# Patient Record
Sex: FEMALE | Race: BLACK OR AFRICAN AMERICAN | Employment: OTHER
[De-identification: names, ages, dates, MRNs, and addresses within clinical notes are randomized per-mention and may not be internally consistent; named-entity substitution may affect disease eponyms.]

---

## 2024-01-06 ENCOUNTER — HOSPITAL ENCOUNTER (EMERGENCY)
Facility: HOSPITAL | Age: 89
Discharge: HOME | End: 2024-01-06
Attending: EMERGENCY MEDICINE
Payer: MEDICARE

## 2024-01-06 ENCOUNTER — APPOINTMENT (OUTPATIENT)
Facility: HOSPITAL | Age: 89
End: 2024-01-06
Payer: MEDICARE

## 2024-01-06 VITALS
OXYGEN SATURATION: 98 % | BODY MASS INDEX: 24.17 KG/M2 | RESPIRATION RATE: 16 BRPM | HEART RATE: 79 BPM | HEIGHT: 63 IN | SYSTOLIC BLOOD PRESSURE: 119 MMHG | DIASTOLIC BLOOD PRESSURE: 80 MMHG | TEMPERATURE: 97.5 F | WEIGHT: 136.4 LBS

## 2024-01-06 DIAGNOSIS — I48.91 ATRIAL FIBRILLATION, UNSPECIFIED TYPE (HCC): ICD-10-CM

## 2024-01-06 DIAGNOSIS — R13.10 DYSPHAGIA, UNSPECIFIED TYPE: Primary | ICD-10-CM

## 2024-01-06 LAB
ALBUMIN SERPL-MCNC: 3 G/DL (ref 3.4–5)
ALBUMIN/GLOB SERPL: 0.9 (ref 0.8–1.7)
ALP SERPL-CCNC: 69 U/L (ref 45–117)
ALT SERPL-CCNC: 32 U/L (ref 13–56)
ANION GAP SERPL CALC-SCNC: 6 MMOL/L (ref 3–18)
APPEARANCE UR: ABNORMAL
AST SERPL-CCNC: 32 U/L (ref 10–38)
BACTERIA URNS QL MICRO: ABNORMAL /HPF
BASOPHILS # BLD: 0.1 K/UL (ref 0–0.1)
BASOPHILS NFR BLD: 1 % (ref 0–2)
BILIRUB SERPL-MCNC: 1.4 MG/DL (ref 0.2–1)
BILIRUB UR QL: ABNORMAL
BUN SERPL-MCNC: 26 MG/DL (ref 7–18)
BUN/CREAT SERPL: 16 (ref 12–20)
CALCIUM SERPL-MCNC: 8.7 MG/DL (ref 8.5–10.1)
CHLORIDE SERPL-SCNC: 108 MMOL/L (ref 100–111)
CO2 SERPL-SCNC: 24 MMOL/L (ref 21–32)
COLOR UR: ABNORMAL
CREAT SERPL-MCNC: 1.62 MG/DL (ref 0.6–1.3)
DIFFERENTIAL METHOD BLD: ABNORMAL
EOSINOPHIL # BLD: 0 K/UL (ref 0–0.4)
EOSINOPHIL NFR BLD: 1 % (ref 0–5)
EPITH CASTS URNS QL MICRO: ABNORMAL /LPF (ref 0–5)
ERYTHROCYTE [DISTWIDTH] IN BLOOD BY AUTOMATED COUNT: 14.5 % (ref 11.6–14.5)
GLOBULIN SER CALC-MCNC: 3.5 G/DL (ref 2–4)
GLUCOSE SERPL-MCNC: 169 MG/DL (ref 74–99)
GLUCOSE UR STRIP.AUTO-MCNC: NEGATIVE MG/DL
GRAN CASTS URNS QL MICRO: ABNORMAL /LPF
HCT VFR BLD AUTO: 42.8 % (ref 35–45)
HGB BLD-MCNC: 13.6 G/DL (ref 12–16)
HGB UR QL STRIP: NEGATIVE
HYALINE CASTS URNS QL MICRO: ABNORMAL /LPF (ref 0–2)
IMM GRANULOCYTES # BLD AUTO: 0.1 K/UL (ref 0–0.04)
IMM GRANULOCYTES NFR BLD AUTO: 1 % (ref 0–0.5)
KETONES UR QL STRIP.AUTO: NEGATIVE MG/DL
LEUKOCYTE ESTERASE UR QL STRIP.AUTO: ABNORMAL
LIPASE SERPL-CCNC: 76 U/L (ref 13–75)
LYMPHOCYTES # BLD: 0.9 K/UL (ref 0.9–3.6)
LYMPHOCYTES NFR BLD: 15 % (ref 21–52)
MAGNESIUM SERPL-MCNC: 2.2 MG/DL (ref 1.6–2.6)
MCH RBC QN AUTO: 27.4 PG (ref 24–34)
MCHC RBC AUTO-ENTMCNC: 31.8 G/DL (ref 31–37)
MCV RBC AUTO: 86.3 FL (ref 78–100)
MONOCYTES # BLD: 0.5 K/UL (ref 0.05–1.2)
MONOCYTES NFR BLD: 8 % (ref 3–10)
NEUTS SEG # BLD: 4.7 K/UL (ref 1.8–8)
NEUTS SEG NFR BLD: 75 % (ref 40–73)
NITRITE UR QL STRIP.AUTO: NEGATIVE
NRBC # BLD: 0 K/UL (ref 0–0.01)
NRBC BLD-RTO: 0 PER 100 WBC
PH UR STRIP: 5 (ref 5–8)
PLATELET # BLD AUTO: 572 K/UL (ref 135–420)
PMV BLD AUTO: 9.1 FL (ref 9.2–11.8)
POTASSIUM SERPL-SCNC: 4.4 MMOL/L (ref 3.5–5.5)
PROT SERPL-MCNC: 6.5 G/DL (ref 6.4–8.2)
PROT UR STRIP-MCNC: 100 MG/DL
RBC # BLD AUTO: 4.96 M/UL (ref 4.2–5.3)
RBC #/AREA URNS HPF: ABNORMAL /HPF (ref 0–5)
SODIUM SERPL-SCNC: 138 MMOL/L (ref 136–145)
SP GR UR REFRACTOMETRY: >1.03 (ref 1–1.03)
TROPONIN I SERPL HS-MCNC: 20 NG/L (ref 0–54)
TROPONIN I SERPL HS-MCNC: 21 NG/L (ref 0–54)
TSH SERPL DL<=0.05 MIU/L-ACNC: 1.81 UIU/ML (ref 0.36–3.74)
UROBILINOGEN UR QL STRIP.AUTO: 1 EU/DL (ref 0.2–1)
WBC # BLD AUTO: 6.2 K/UL (ref 4.6–13.2)
WBC URNS QL MICRO: ABNORMAL /HPF (ref 0–4)

## 2024-01-06 PROCEDURE — 84484 ASSAY OF TROPONIN QUANT: CPT

## 2024-01-06 PROCEDURE — 99285 EMERGENCY DEPT VISIT HI MDM: CPT

## 2024-01-06 PROCEDURE — 93005 ELECTROCARDIOGRAM TRACING: CPT | Performed by: EMERGENCY MEDICINE

## 2024-01-06 PROCEDURE — 71045 X-RAY EXAM CHEST 1 VIEW: CPT

## 2024-01-06 PROCEDURE — 83735 ASSAY OF MAGNESIUM: CPT

## 2024-01-06 PROCEDURE — 81001 URINALYSIS AUTO W/SCOPE: CPT

## 2024-01-06 PROCEDURE — 83690 ASSAY OF LIPASE: CPT

## 2024-01-06 PROCEDURE — 70450 CT HEAD/BRAIN W/O DYE: CPT

## 2024-01-06 PROCEDURE — 84443 ASSAY THYROID STIM HORMONE: CPT

## 2024-01-06 PROCEDURE — 2500000003 HC RX 250 WO HCPCS: Performed by: EMERGENCY MEDICINE

## 2024-01-06 PROCEDURE — 85025 COMPLETE CBC W/AUTO DIFF WBC: CPT

## 2024-01-06 PROCEDURE — 96374 THER/PROPH/DIAG INJ IV PUSH: CPT

## 2024-01-06 PROCEDURE — 80053 COMPREHEN METABOLIC PANEL: CPT

## 2024-01-06 RX ORDER — DILTIAZEM HYDROCHLORIDE 5 MG/ML
10 INJECTION INTRAVENOUS ONCE
Status: COMPLETED | OUTPATIENT
Start: 2024-01-06 | End: 2024-01-06

## 2024-01-06 RX ORDER — METOPROLOL SUCCINATE 50 MG/1
50 TABLET, EXTENDED RELEASE ORAL 2 TIMES DAILY
COMMUNITY
Start: 2024-01-04 | End: 2024-02-03

## 2024-01-06 RX ORDER — ONDANSETRON 4 MG/1
4 TABLET, ORALLY DISINTEGRATING ORAL EVERY 8 HOURS PRN
COMMUNITY
Start: 2024-01-04 | End: 2024-01-18

## 2024-01-06 RX ADMIN — DILTIAZEM HYDROCHLORIDE 10 MG: 5 INJECTION INTRAVENOUS at 16:56

## 2024-01-06 ASSESSMENT — PAIN - FUNCTIONAL ASSESSMENT: PAIN_FUNCTIONAL_ASSESSMENT: NONE - DENIES PAIN

## 2024-01-06 NOTE — ED PROVIDER NOTES
[facility] EMERGENCY DEPT  EMERGENCY DEPARTMENT ENCOUNTER    Patient Name: Marija Shrestha  MRN: [id number]  YOB: 1930  Provider: Niko Krishna DO  PCP: Cristela Joseph MD   Time/Date of evaluation: 1:31 PM EST on 1/6/24    History of Presenting Illness     History Provided by: Patient  History is limited by: Nothing     HISTORY:   Marija Shrestha is a 89 y.o. female with recent diagnosis of DVT, and pulmonary embolism currently on Eliquis presents with a chief complaint of decreased appetite and difficulty keeping down her meals.  Patient states that she was recently diagnosed with DVT and PE at [facility].  She was diagnosed on December 21 with acute DVT of the left popliteal and left femoral veins.  She was also diagnosed with A-fib with RVR and CHF during that admission.  She was seen and the [facility] emergency department on January 4 and diagnosed with dysphagia.  She was given follow-up referral to Dr. Jose M Hester, ENT physician but states that she was told that she could not get an appointment until February 2024.      nursing Notes were all reviewed and agreed with or any disagreements were addressed in the HPI.    Past History     PAST MEDICAL HISTORY:  Past Medical History:   Diagnosis Date    Atrial fibrillation (HCC)     DVT (deep venous thrombosis) (HCC)     Enlarged thyroid     Hypercholesterolemia     Hypertension     Multinodular goiter     Osteoporosis     Paroxysmal nocturnal dyspnea     Post-nasal drip        PAST SURGICAL HISTORY:  History reviewed. No pertinent surgical history.    FAMILY HISTORY:  History reviewed. No pertinent family history.    SOCIAL HISTORY:       MEDICATIONS:  No current facility-administered medications for this encounter.     Current Outpatient Medications   Medication Sig Dispense Refill    apixaban (ELIQUIS) 5 MG TABS tablet Take 1 tablet by mouth 2 times daily      metoprolol succinate (TOPROL XL) 50 MG extended release tablet Take 1 tablet by  Notes from Outside Facility (Separate Group), Previous EKGs, Ambulance Run Sheet, Previous Radiology Studies, and Previous Laboratory Studies    Is this patient to be included in the SEP-1 core measure? No Exclusion criteria - the patient is NOT to be included for SEP-1 Core Measure due to: Infection is not suspected    MEDICATIONS ADMINISTERED IN THE ED:  Medications   dilTIAZem injection 10 mg (10 mg IntraVENous Given 1/6/24 7237)            None    Critical Care: None    Diagnosis and Disposition   Diagnosis:   1. Dysphagia, unspecified type    2. Atrial fibrillation, unspecified type (HCC)          Disposition:    DISPOSITION Decision To Discharge 01/06/2024 05:10:03 PM    Home or Self Care     [unfilled]      Dragon Disclaimer     Please note that this dictation was completed with Adenios, the computer voice recognition software. Quite often unanticipated grammatical, syntax, homophones, and other interpretive errors are inadvertently transcribed by the computer software. Please disregard these errors. Please excuse any errors that have escaped final proofreading.      I Niko Krishna DO am the primary clinician of record.  Niko Krishna DO  (Electronically signed)            Niko Krishna DO  01/06/24 6438

## 2024-01-06 NOTE — ED NOTES
Spoke with Melisa in lab who states she got the recollected blood and will reactivate the orders.   Patient up to bathroom with steady gait, urine specimen obtained, and is back to stretcher.

## 2024-01-06 NOTE — ED NOTES
Patient states went to [facility] ED for difficulty swallowing and told she had a thyroid problem and referred to ENT. She also was started on Eliquis for PE and DVT x2 to left leg. She states she can not get into ENT for a month. Provider at bedside.

## 2024-01-06 NOTE — ED NOTES
I have introduced myself to the patient and discussed anticipated plan of care. Patient resting quietly on stretcher in low and locked position. Call bell in reach. Side rail up x1.    Patient on cardiac monitor and continuous pulse oximetry.

## 2024-01-07 LAB
EKG ATRIAL RATE: 357 BPM
EKG DIAGNOSIS: NORMAL
EKG Q-T INTERVAL: 350 MS
EKG QRS DURATION: 80 MS
EKG QTC CALCULATION (BAZETT): 488 MS
EKG R AXIS: 30 DEGREES
EKG T AXIS: 51 DEGREES
EKG VENTRICULAR RATE: 117 BPM

## 2024-01-07 PROCEDURE — 93010 ELECTROCARDIOGRAM REPORT: CPT | Performed by: INTERNAL MEDICINE

## 2024-01-18 ENCOUNTER — OFFICE VISIT (OUTPATIENT)
Age: 89
End: 2024-01-18

## 2024-01-18 VITALS
HEART RATE: 109 BPM | OXYGEN SATURATION: 94 % | DIASTOLIC BLOOD PRESSURE: 86 MMHG | HEIGHT: 63 IN | SYSTOLIC BLOOD PRESSURE: 132 MMHG | WEIGHT: 137 LBS | BODY MASS INDEX: 24.27 KG/M2

## 2024-01-18 DIAGNOSIS — I48.91 ATRIAL FIBRILLATION, UNSPECIFIED TYPE (HCC): Primary | ICD-10-CM

## 2024-01-18 RX ORDER — FUROSEMIDE 20 MG/1
20 TABLET ORAL
Qty: 30 TABLET | Refills: 6 | Status: SHIPPED | OUTPATIENT
Start: 2024-01-19

## 2024-01-18 ASSESSMENT — PATIENT HEALTH QUESTIONNAIRE - PHQ9
SUM OF ALL RESPONSES TO PHQ QUESTIONS 1-9: 0
1. LITTLE INTEREST OR PLEASURE IN DOING THINGS: 0
SUM OF ALL RESPONSES TO PHQ9 QUESTIONS 1 & 2: 0
SUM OF ALL RESPONSES TO PHQ QUESTIONS 1-9: 0
2. FEELING DOWN, DEPRESSED OR HOPELESS: 0

## 2024-01-18 NOTE — PATIENT INSTRUCTIONS
Start: Lasix 20 mg, 3 times a week  PCP: Dr. Jessica Bo Telephone # [phone]  or   PCP: Dr. Nora Rodriguez Telephone # [phone]    Patient Education        Low Sodium Diet (2,000 Milligram): Care Instructions  Overview     Limiting sodium can be an important part of managing some health problems.  The most common source of sodium is salt. People get most of the salt in their diet from canned, prepared, and packaged foods. Fast food and restaurant meals also are very high in sodium. Your doctor will probably limit your sodium to less than 2,000 milligrams (mg) a day. This limit counts all the sodium in prepared and packaged foods and any salt you add to your food.  Follow-up care is a key part of your treatment and safety. Be sure to make and go to all appointments, and call your doctor if you are having problems. It's also a good idea to know your test results and keep a list of the medicines you take.  How can you care for yourself at home?  Read food labels  Read labels on cans and food packages. The labels tell you how much sodium is in each serving. Make sure that you look at the serving size. If you eat more than the serving size, you have eaten more sodium.  Food labels also tell you the Percent Daily Value for sodium. Choose products with low Percent Daily Values for sodium.  Be aware that sodium can come in forms other than salt, including monosodium glutamate (MSG), sodium citrate, and sodium bicarbonate (baking soda). MSG is often added to Asian food. When you eat out, you can sometimes ask for food without MSG or added salt.  Buy low-sodium foods  Buy foods that are labeled \"unsalted\" (no salt added), \"sodium-free\" (less than 5 mg of sodium per serving), or \"low-sodium\" (140 mg or less of sodium per serving). Foods labeled \"reduced-sodium\" and \"light sodium\" may still have too much sodium. Be sure to read the label to see how much sodium you are getting.  Buy fresh vegetables, or frozen

## 2024-01-18 NOTE — PROGRESS NOTES
Marija S Fady presents today for   Chief Complaint   Patient presents with    New Patient     Est care referred by self due to seen at ED 1/6/24 fdx with CHRIS       Marija Shrestha preferred language for health care discussion is english/other.    Is someone accompanying this pt? NO    Is the patient using any DME equipment during OV? NO    Depression Screening:  Depression: Not on file        Learning Assessment:  Who is the primary learner? Patient    What is the preferred language for health care of the primary learner? ENGLISH    How does the primary learner prefer to learn new concepts? DEMONSTRATION    Answered By PATIENT    Relationship to Learner SELF           Pt currently taking Anticoagulant therapy? ELIQUIS 5 MG 2X DAILY     Pt currently taking Antiplatelet therapy ? NO      Coordination of Care:  1. Have you been to the ER, urgent care clinic since your last visit? Hospitalized since your last visit? NO    2. Have you seen or consulted any other health care providers outside of the [facility] since your last visit? Include any pap smears or colon screening. NO    
Vascular: No carotid bruit.   Cardiovascular:      Rate and Rhythm: Normal rate. Rhythm irregular.      Pulses: Normal pulses.      Heart sounds: No murmur heard.     No friction rub. No gallop.      Comments: +JVD  Pulmonary:      Effort: Pulmonary effort is normal.      Breath sounds: Normal breath sounds. No wheezing or rales.   Abdominal:      Palpations: Abdomen is soft.   Musculoskeletal:      Cervical back: Neck supple.      Right lower leg: Edema present.      Left lower leg: Edema present.   Skin:     General: Skin is warm and dry.   Neurological:      General: No focal deficit present.      Mental Status: She is alert and oriented to person, place, and time.   Psychiatric:         Mood and Affect: Mood normal.         EKG atrial fib 109    Echo 12/2023    Left Ventricle: Left ventricle size is normal. There is increased wall   thickness with normal left ventricle mass index consistent with concentric   remodeling. There is moderate global hypokinesis present. The ejection   fraction by visual approximation is 35 - 40%. There is moderate systolic   dysfunction.    Left Atrium: Left atrium is severely dilated.     Tricuspid Valve: Tricuspid valve appears normal. Normal leaflet   thickness. Moderate to severe (3+) transvalvular regurgitation. Normal   pulmonary artery pressure.  RVSP is 35.0 mmHg.     Right Atrium: Right atrium is dilated.     Right Ventricle: Right ventricle size is normal. Global hypokinesis   present. Mildly reduced systolic function.     Aortic Valve: Trileaflet aortic valve. No stenosis. No transvalvular   regurgitation .     Mitral Valve: Mitral valve appears normal. No stenosis. Moderate (2-3+)   transvalvular regurgitation with multiple jets .       Impression and Plan:  Marija Shrestha is a 89 y.o. with:    AECHF/ RHF/ HFrEF 35-40%  pAFib, currently rate controlled  S/p DVT/ PE, known 12/2023  HTN  CKD, Scr 1.6  Advanced age    Continue Toprol + NOAC for Afib  NOAC dose may need to

## 2024-01-22 ENCOUNTER — HOSPITAL ENCOUNTER (OUTPATIENT)
Facility: HOSPITAL | Age: 89
Discharge: HOME | End: 2024-01-25
Attending: OTOLARYNGOLOGY
Payer: MEDICARE

## 2024-01-22 DIAGNOSIS — R13.10 DYSPHAGIA, UNSPECIFIED TYPE: ICD-10-CM

## 2024-01-22 PROCEDURE — 6370000000 HC RX 637 (ALT 250 FOR IP): Performed by: STUDENT IN AN ORGANIZED HEALTH CARE EDUCATION/TRAINING PROGRAM

## 2024-01-22 PROCEDURE — 74220 X-RAY XM ESOPHAGUS 1CNTRST: CPT

## 2024-01-22 PROCEDURE — 2500000003 HC RX 250 WO HCPCS: Performed by: STUDENT IN AN ORGANIZED HEALTH CARE EDUCATION/TRAINING PROGRAM

## 2024-01-22 RX ADMIN — BARIUM SULFATE 176 G: 960 POWDER, FOR SUSPENSION ORAL at 11:56

## 2024-01-22 RX ADMIN — ANTACID/ANTIFLATULENT 1 EACH: 380; 550; 10; 10 GRANULE, EFFERVESCENT ORAL at 11:55

## 2024-01-22 RX ADMIN — BARIUM SULFATE 135 ML: 980 POWDER, FOR SUSPENSION ORAL at 11:55

## 2024-01-22 RX ADMIN — BARIUM SULFATE 1 TABLET: 700 TABLET ORAL at 11:56

## 2024-02-05 ENCOUNTER — TELEPHONE (OUTPATIENT)
Age: 89
End: 2024-02-05

## 2024-02-06 RX ORDER — BUMETANIDE 1 MG/1
1 TABLET ORAL DAILY
Qty: 30 TABLET | Refills: 1 | Status: SHIPPED | OUTPATIENT
Start: 2024-02-06

## 2024-02-06 RX ORDER — AMIODARONE HYDROCHLORIDE 200 MG/1
200 TABLET ORAL 2 TIMES DAILY
COMMUNITY
Start: 2024-01-31 | End: 2024-02-06 | Stop reason: SDUPTHER

## 2024-02-06 RX ORDER — METOPROLOL SUCCINATE 25 MG/1
75 TABLET, EXTENDED RELEASE ORAL 2 TIMES DAILY
COMMUNITY
End: 2024-02-06 | Stop reason: SDUPTHER

## 2024-02-06 RX ORDER — AMIODARONE HYDROCHLORIDE 200 MG/1
200 TABLET ORAL 2 TIMES DAILY
Qty: 30 TABLET | Refills: 1 | Status: SHIPPED | OUTPATIENT
Start: 2024-02-06

## 2024-02-06 RX ORDER — SPIRONOLACTONE 25 MG/1
12.5 TABLET ORAL DAILY
Qty: 30 TABLET | Refills: 1 | Status: SHIPPED | OUTPATIENT
Start: 2024-02-06

## 2024-02-06 RX ORDER — METOPROLOL SUCCINATE 25 MG/1
75 TABLET, EXTENDED RELEASE ORAL 2 TIMES DAILY
Qty: 180 TABLET | Refills: 1 | Status: SHIPPED | OUTPATIENT
Start: 2024-02-06

## 2024-02-06 RX ORDER — SPIRONOLACTONE 25 MG/1
12.5 TABLET ORAL DAILY
COMMUNITY
Start: 2024-01-31 | End: 2024-02-06 | Stop reason: SDUPTHER

## 2024-02-06 RX ORDER — BUMETANIDE 1 MG/1
1 TABLET ORAL DAILY
COMMUNITY
Start: 2024-01-31 | End: 2024-02-06 | Stop reason: SDUPTHER

## 2024-02-06 NOTE — TELEPHONE ENCOUNTER
Received fax from [facility] regarding concern for possible therapeutic duplication, for drugs: bumetanide and furosemide.   Called patient to clarify. Spoke with her son, who assisted me in updating her medication list. Per patient's son, patient is no longer taking furosemide. This was discontinued during a recent hospital stay. Medication list is now updated with most recent information.   He also stated that his mother needs some refills. Will send refill request to provider.

## 2024-02-15 ENCOUNTER — HOSPITAL ENCOUNTER (OUTPATIENT)
Facility: HOSPITAL | Age: 89
Discharge: HOME | End: 2024-02-15
Payer: MEDICARE

## 2024-02-15 LAB
ALBUMIN SERPL-MCNC: 4 G/DL (ref 3.4–5)
ALBUMIN/GLOB SERPL: 1.2 (ref 0.8–1.7)
ALP SERPL-CCNC: 74 U/L (ref 45–117)
ALT SERPL-CCNC: 22 U/L (ref 13–56)
ANION GAP SERPL CALC-SCNC: 7 MMOL/L (ref 3–18)
AST SERPL-CCNC: 23 U/L (ref 10–38)
BILIRUB SERPL-MCNC: 1.4 MG/DL (ref 0.2–1)
BUN SERPL-MCNC: 20 MG/DL (ref 7–18)
BUN/CREAT SERPL: 18 (ref 12–20)
CALCIUM SERPL-MCNC: 9.5 MG/DL (ref 8.5–10.1)
CHLORIDE SERPL-SCNC: 100 MMOL/L (ref 100–111)
CO2 SERPL-SCNC: 30 MMOL/L (ref 21–32)
CREAT SERPL-MCNC: 1.14 MG/DL (ref 0.6–1.3)
GLOBULIN SER CALC-MCNC: 3.4 G/DL (ref 2–4)
GLUCOSE SERPL-MCNC: 107 MG/DL (ref 74–99)
POTASSIUM SERPL-SCNC: 4.4 MMOL/L (ref 3.5–5.5)
PROT SERPL-MCNC: 7.4 G/DL (ref 6.4–8.2)
SODIUM SERPL-SCNC: 137 MMOL/L (ref 136–145)

## 2024-02-15 PROCEDURE — 80053 COMPREHEN METABOLIC PANEL: CPT

## 2024-02-15 PROCEDURE — 36415 COLL VENOUS BLD VENIPUNCTURE: CPT

## 2024-02-16 ENCOUNTER — TELEPHONE (OUTPATIENT)
Age: 89
End: 2024-02-16

## 2024-02-16 NOTE — TELEPHONE ENCOUNTER
Received fax requesting cardiac clearance for EGD from [facility].   Procedure date 6/28/24. Will discuss with Dr. Feng.

## 2024-02-16 NOTE — PROGRESS NOTES
Marija Shrestha presents today for a post-hospital follow-up of CHF.   She was admitted to [facility] on 1/26/24 and discharged home on 1/31/24 after presenting with shortness of breath and edema.  She was diagnosed with acute heart failure (BNP was 15,890) and AFIB with RVR.  She was followed by [facility].  An echo was done on 1/27/24 and her EF was reduced at 45%, moderate to severe TR.  RVSP was 58 mmHg.  When compared to the echo done on 12/22/23, her EF had increased from 35-40% to 45%.  Moderate pulmonary hypertension and dilated right ventricle are new.  She underwent cardioversion on 1/30/24 and she converted back to sinus rhythm after receiving 100 joules of energy.  She was prescribed amiodarone 200mg BID x 14 days and then 200mg daily thereafter for 14 days. She is anticoagulated with Eliquis.   She was also discharged home on bumetanide 1mg daily and spironolactone 25mg daily.  During this hospital stay, she was also diagnosed with dysphagia and underwent a barium swallow which showed Esophageal dysmotility in the mid and distal esophagus with narrowing of the distal esophagus as well as a small sliding hiatal hernia.    She was accompanied by 2 of her sons today.  She is aware of her medical issues and provides some of her history.  Her sons also provide history.   Her son states that her PCP recently changed her Toprol XL to 50mg daily.  She states that she is feeling much better when compared to pre-admission.  She denies chest pain, shortness of breath, palpitations, orthopnea, PND.  Her lower extremity edema has resolved and she is wearing compression socks.    She is a 89 year old female who established care with Dr. Feng on 1/18/24 after being hospitalized at [facility] (in Dec.2023) after presenting with shortness of breath.  She was found to have a DVT in the left lower extremity, bilateral pulmonary emboli, and paroxysmal atrial fibrillation.  During that visit, she was started

## 2024-02-21 NOTE — TELEPHONE ENCOUNTER
Verbal order and read back per Louisa Feng, DO  Can proceed with EGD at high risk. Risk cannot be further optimized or adjusted. Hold eliquis 2 days prior to procedure. Called patient's son, Peter, with these instructions. He verbalized understanding.  Clearance form faxed back to [phone].

## 2024-02-22 ENCOUNTER — OFFICE VISIT (OUTPATIENT)
Age: 89
End: 2024-02-22
Payer: MEDICARE

## 2024-02-22 VITALS
SYSTOLIC BLOOD PRESSURE: 134 MMHG | DIASTOLIC BLOOD PRESSURE: 82 MMHG | WEIGHT: 137 LBS | HEART RATE: 62 BPM | HEIGHT: 63 IN | BODY MASS INDEX: 24.27 KG/M2

## 2024-02-22 DIAGNOSIS — I42.9 CARDIOMYOPATHY, UNSPECIFIED TYPE (HCC): ICD-10-CM

## 2024-02-22 DIAGNOSIS — I50.42 CHRONIC COMBINED SYSTOLIC AND DIASTOLIC CONGESTIVE HEART FAILURE (HCC): ICD-10-CM

## 2024-02-22 DIAGNOSIS — I10 ESSENTIAL (PRIMARY) HYPERTENSION: ICD-10-CM

## 2024-02-22 DIAGNOSIS — I48.91 ATRIAL FIBRILLATION, UNSPECIFIED TYPE (HCC): Primary | ICD-10-CM

## 2024-02-22 PROCEDURE — 1123F ACP DISCUSS/DSCN MKR DOCD: CPT | Performed by: NURSE PRACTITIONER

## 2024-02-22 PROCEDURE — 1090F PRES/ABSN URINE INCON ASSESS: CPT | Performed by: NURSE PRACTITIONER

## 2024-02-22 PROCEDURE — 99215 OFFICE O/P EST HI 40 MIN: CPT | Performed by: NURSE PRACTITIONER

## 2024-02-22 PROCEDURE — 93000 ELECTROCARDIOGRAM COMPLETE: CPT | Performed by: NURSE PRACTITIONER

## 2024-02-22 PROCEDURE — G8427 DOCREV CUR MEDS BY ELIG CLIN: HCPCS | Performed by: NURSE PRACTITIONER

## 2024-02-22 PROCEDURE — G8420 CALC BMI NORM PARAMETERS: HCPCS | Performed by: NURSE PRACTITIONER

## 2024-02-22 PROCEDURE — G8484 FLU IMMUNIZE NO ADMIN: HCPCS | Performed by: NURSE PRACTITIONER

## 2024-02-22 PROCEDURE — 1036F TOBACCO NON-USER: CPT | Performed by: NURSE PRACTITIONER

## 2024-02-22 RX ORDER — METOPROLOL SUCCINATE 50 MG/1
50 TABLET, EXTENDED RELEASE ORAL DAILY
Qty: 1 TABLET | Refills: 0
Start: 2024-02-22

## 2024-02-22 RX ORDER — OMEPRAZOLE 40 MG/1
40 CAPSULE, DELAYED RELEASE ORAL DAILY
COMMUNITY
Start: 2024-02-15

## 2024-02-22 RX ORDER — METOPROLOL SUCCINATE 25 MG/1
50 TABLET, EXTENDED RELEASE ORAL DAILY
Qty: 1 TABLET | Refills: 0 | Status: SHIPPED
Start: 2024-02-22 | End: 2024-02-22

## 2024-02-22 RX ORDER — AMIODARONE HYDROCHLORIDE 200 MG/1
200 TABLET ORAL DAILY
Qty: 1 TABLET | Refills: 0 | Status: SHIPPED
Start: 2024-02-22

## 2024-02-22 ASSESSMENT — ENCOUNTER SYMPTOMS
TROUBLE SWALLOWING: 1
CONSTIPATION: 0
WHEEZING: 0
NAUSEA: 0
DIARRHEA: 0
VOMITING: 0
COUGH: 0
SHORTNESS OF BREATH: 0
BLOOD IN STOOL: 0
CHEST TIGHTNESS: 0
ABDOMINAL DISTENTION: 0

## 2024-02-22 ASSESSMENT — PATIENT HEALTH QUESTIONNAIRE - PHQ9
SUM OF ALL RESPONSES TO PHQ QUESTIONS 1-9: 0
1. LITTLE INTEREST OR PLEASURE IN DOING THINGS: 0
SUM OF ALL RESPONSES TO PHQ QUESTIONS 1-9: 0
SUM OF ALL RESPONSES TO PHQ QUESTIONS 1-9: 0
2. FEELING DOWN, DEPRESSED OR HOPELESS: 0
SUM OF ALL RESPONSES TO PHQ QUESTIONS 1-9: 0
SUM OF ALL RESPONSES TO PHQ9 QUESTIONS 1 & 2: 0

## 2024-02-22 NOTE — PROGRESS NOTES
Marija Shrestha presents today for   Chief Complaint   Patient presents with    Follow-up     3 week       Marija Shrestha preferred language for health care discussion is english/other.    Is someone accompanying this pt? no    Is the patient using any DME equipment during OV? no    Depression Screening:  Depression: Not at risk (2/22/2024)    PHQ-2     PHQ-2 Score: 0        Learning Assessment:  Who is the primary learner? Patient    What is the preferred language for health care of the primary learner? ENGLISH    How does the primary learner prefer to learn new concepts? DEMONSTRATION    Answered By patient    Relationship to Learner SELF           Pt currently taking Anticoagulant therapy? no    Pt currently taking Antiplatelet therapy ? eliquis      Coordination of Care:  1. Have you been to the ER, urgent care clinic since your last visit? Hospitalized since your last visit? no    2. Have you seen or consulted any other health care providers outside of the [facility] since your last visit? Include any pap smears or colon screening. no

## 2024-02-22 NOTE — PATIENT INSTRUCTIONS
Continue present medication regimen  Complete amiodarone as per discharge instructions  Follow-up with Dr. Feng as scheduled and as needed  Weigh daily and record  Limit sodium intake to 1500-2000mg per day  Limit fluid intake to no more than 6-7, eight ounce glasses of any type of fluids per day (total of 48 to 56 ounces per day)  Call if you notice sudden or progressive weight gain (3-5 pounds in 2-3 days), increasing shortness of breath, abdominal bloating, increasing lower extremity edema, inability to lie flat or on your normal number of pillows, having to sit up to catch your breath, fatigue, increased somnolence (sleeping more), poor appetite

## 2024-02-26 ENCOUNTER — TELEPHONE (OUTPATIENT)
Age: 89
End: 2024-02-26

## 2024-02-26 RX ORDER — AMIODARONE HYDROCHLORIDE 200 MG/1
200 TABLET ORAL DAILY
Qty: 30 TABLET | Refills: 5 | Status: SHIPPED | OUTPATIENT
Start: 2024-02-26

## 2024-02-26 NOTE — TELEPHONE ENCOUNTER
----- Message from Louisa Feng DO sent at 2/21/2024  1:14 PM EST -----  Labs look great and her kidney function looks back to her baseline    ----- Message -----  From: Angi Min, RN  Sent: 2/19/2024   2:08 PM EST  To: Louisa Feng DO    Per your last note:  AECHF/ RHF/ HFrEF 35-40%  pAFib, currently rate controlled  S/p DVT/ PE, known 12/2023  HTN  CKD, Scr 1.6  Advanced age     Continue Toprol + NOAC for Afib  NOAC dose may need to be adjusted due to age and kidney function  Start Lasix ~3x/ week for AECHF  Low sodium diet education  Needs repeat labs and close follow up CHF  Significant time spent with pt and family  Likely will need to recheck limited echo to see if can further GDMT after out of acute fluid overload

## 2024-04-25 ENCOUNTER — OFFICE VISIT (OUTPATIENT)
Age: 89
End: 2024-04-25
Payer: MEDICARE

## 2024-04-25 VITALS
BODY MASS INDEX: 19.84 KG/M2 | SYSTOLIC BLOOD PRESSURE: 130 MMHG | HEART RATE: 56 BPM | WEIGHT: 112 LBS | OXYGEN SATURATION: 99 % | DIASTOLIC BLOOD PRESSURE: 82 MMHG | HEIGHT: 63 IN

## 2024-04-25 DIAGNOSIS — I42.9 CARDIOMYOPATHY, UNSPECIFIED TYPE (HCC): ICD-10-CM

## 2024-04-25 DIAGNOSIS — I48.91 ATRIAL FIBRILLATION, UNSPECIFIED TYPE (HCC): Primary | ICD-10-CM

## 2024-04-25 DIAGNOSIS — I10 ESSENTIAL (PRIMARY) HYPERTENSION: ICD-10-CM

## 2024-04-25 DIAGNOSIS — I50.42 CHRONIC COMBINED SYSTOLIC AND DIASTOLIC CONGESTIVE HEART FAILURE (HCC): ICD-10-CM

## 2024-04-25 PROBLEM — Z51.5 PALLIATIVE CARE BY SPECIALIST: Status: ACTIVE | Noted: 2024-01-29

## 2024-04-25 PROBLEM — I50.20 HFREF (HEART FAILURE WITH REDUCED EJECTION FRACTION) (HCC): Status: ACTIVE | Noted: 2024-02-06

## 2024-04-25 PROBLEM — I26.99 PULMONARY EMBOLISM WITHOUT ACUTE COR PULMONALE (HCC): Status: ACTIVE | Noted: 2023-12-21

## 2024-04-25 PROBLEM — J34.89 RHINORRHEA: Status: ACTIVE | Noted: 2023-12-27

## 2024-04-25 PROBLEM — I48.19 PERSISTENT ATRIAL FIBRILLATION (HCC): Status: ACTIVE | Noted: 2023-12-27

## 2024-04-25 PROBLEM — R62.7 FTT (FAILURE TO THRIVE) IN ADULT: Status: ACTIVE | Noted: 2024-01-29

## 2024-04-25 PROBLEM — N18.30 CHRONIC KIDNEY DISEASE, STAGE 3 (HCC): Status: ACTIVE | Noted: 2023-07-25

## 2024-04-25 PROBLEM — R13.19 ESOPHAGEAL DYSPHAGIA: Status: ACTIVE | Noted: 2024-01-11

## 2024-04-25 PROBLEM — J18.9 PNEUMONIA: Status: ACTIVE | Noted: 2024-01-26

## 2024-04-25 PROBLEM — R54 FRAILTY: Status: ACTIVE | Noted: 2024-01-29

## 2024-04-25 PROBLEM — I50.22 CHRONIC SYSTOLIC HEART FAILURE (HCC): Status: ACTIVE | Noted: 2024-01-26

## 2024-04-25 PROBLEM — R53.81 DEBILITY: Status: ACTIVE | Noted: 2024-01-29

## 2024-04-25 PROBLEM — Z66 DNR (DO NOT RESUSCITATE): Status: ACTIVE | Noted: 2024-01-29

## 2024-04-25 PROBLEM — Z86.711 HISTORY OF PULMONARY EMBOLUS (PE): Status: ACTIVE | Noted: 2024-01-26

## 2024-04-25 PROBLEM — Z71.89 GOALS OF CARE, COUNSELING/DISCUSSION: Status: ACTIVE | Noted: 2024-01-29

## 2024-04-25 PROCEDURE — 1090F PRES/ABSN URINE INCON ASSESS: CPT | Performed by: INTERNAL MEDICINE

## 2024-04-25 PROCEDURE — 1123F ACP DISCUSS/DSCN MKR DOCD: CPT | Performed by: INTERNAL MEDICINE

## 2024-04-25 PROCEDURE — G8420 CALC BMI NORM PARAMETERS: HCPCS | Performed by: INTERNAL MEDICINE

## 2024-04-25 PROCEDURE — G8428 CUR MEDS NOT DOCUMENT: HCPCS | Performed by: INTERNAL MEDICINE

## 2024-04-25 PROCEDURE — 93000 ELECTROCARDIOGRAM COMPLETE: CPT | Performed by: INTERNAL MEDICINE

## 2024-04-25 PROCEDURE — 99215 OFFICE O/P EST HI 40 MIN: CPT | Performed by: INTERNAL MEDICINE

## 2024-04-25 PROCEDURE — 1036F TOBACCO NON-USER: CPT | Performed by: INTERNAL MEDICINE

## 2024-04-25 RX ORDER — METOPROLOL SUCCINATE 50 MG/1
50 TABLET, EXTENDED RELEASE ORAL DAILY
Qty: 90 TABLET | Refills: 3 | Status: SHIPPED | OUTPATIENT
Start: 2024-04-25

## 2024-04-25 RX ORDER — BUMETANIDE 1 MG/1
1 TABLET ORAL EVERY OTHER DAY
COMMUNITY

## 2024-04-25 ASSESSMENT — ANXIETY QUESTIONNAIRES
7. FEELING AFRAID AS IF SOMETHING AWFUL MIGHT HAPPEN: NOT AT ALL
3. WORRYING TOO MUCH ABOUT DIFFERENT THINGS: NOT AT ALL
6. BECOMING EASILY ANNOYED OR IRRITABLE: NOT AT ALL
1. FEELING NERVOUS, ANXIOUS, OR ON EDGE: NOT AT ALL
4. TROUBLE RELAXING: NOT AT ALL
GAD7 TOTAL SCORE: 0
5. BEING SO RESTLESS THAT IT IS HARD TO SIT STILL: NOT AT ALL
2. NOT BEING ABLE TO STOP OR CONTROL WORRYING: NOT AT ALL

## 2024-04-25 ASSESSMENT — PATIENT HEALTH QUESTIONNAIRE - PHQ9
1. LITTLE INTEREST OR PLEASURE IN DOING THINGS: NOT AT ALL
SUM OF ALL RESPONSES TO PHQ QUESTIONS 1-9: 0
SUM OF ALL RESPONSES TO PHQ QUESTIONS 1-9: 0
2. FEELING DOWN, DEPRESSED OR HOPELESS: NOT AT ALL
SUM OF ALL RESPONSES TO PHQ QUESTIONS 1-9: 0
SUM OF ALL RESPONSES TO PHQ9 QUESTIONS 1 & 2: 0
SUM OF ALL RESPONSES TO PHQ QUESTIONS 1-9: 0

## 2024-04-25 NOTE — PROGRESS NOTES
Marija S Shrestha presents today for   Chief Complaint   Patient presents with    Follow-up     Add on       Marija Shrestha preferred language for health care discussion is english/other.    Is someone accompanying this pt? yes    Is the patient using any DME equipment during OV? no    Depression Screening:  Depression: Not at risk (4/25/2024)    PHQ-2     PHQ-2 Score: 0        Learning Assessment:  Who is the primary learner? Patient    What is the preferred language for health care of the primary learner? ENGLISH    How does the primary learner prefer to learn new concepts? DEMONSTRATION    Answered By patient    Relationship to Learner SELF           Pt currently taking Anticoagulant therapy? Eliquis 5 mg bid    Pt currently taking Antiplatelet therapy ? no      Coordination of Care:  1. Have you been to the ER, urgent care clinic since your last visit? Hospitalized since your last visit? no    2. Have you seen or consulted any other health care providers outside of the [facility] since your last visit? Include any pap smears or colon screening. no    
known 12/2023  HTN  CKD, Scr 1.6  Advanced age    A lot has happened since I last saw her  We will reduce her Toprol just a bit to 50 XL  She is on BB for rate control but also +MRA for HFrEF  She can pull back her Bumex to every other day- but weigh yourself daily & monitor for swelling  Otherwise she is to stay on amio for now  RTC 3 months NP, 6 months with me    Thank you for allowing me to participate in the care of your patient, please do not hesitate to call with questions or concerns.    Regards,    Louisa Feng, DO

## 2024-04-25 NOTE — PATIENT INSTRUCTIONS
Take bumetanide (BUMEX) 1 mg every other day.   Take metoprolol succinate (TOPROL XL) 50 mg daily.

## 2024-07-12 RX ORDER — SPIRONOLACTONE 25 MG/1
12.5 TABLET ORAL DAILY
Qty: 45 TABLET | Refills: 3 | Status: SHIPPED | OUTPATIENT
Start: 2024-07-12

## 2024-07-22 ASSESSMENT — ENCOUNTER SYMPTOMS
WHEEZING: 0
SHORTNESS OF BREATH: 0
VOMITING: 0
COUGH: 0
CONSTIPATION: 0
BLOOD IN STOOL: 0
NAUSEA: 0
CHEST TIGHTNESS: 0
ABDOMINAL DISTENTION: 0
TROUBLE SWALLOWING: 1
DIARRHEA: 0

## 2024-07-25 ENCOUNTER — OFFICE VISIT (OUTPATIENT)
Age: 89
End: 2024-07-25

## 2024-07-25 VITALS
OXYGEN SATURATION: 99 % | HEART RATE: 67 BPM | DIASTOLIC BLOOD PRESSURE: 80 MMHG | HEIGHT: 63 IN | BODY MASS INDEX: 21.26 KG/M2 | SYSTOLIC BLOOD PRESSURE: 160 MMHG | WEIGHT: 120 LBS

## 2024-07-25 DIAGNOSIS — I10 ESSENTIAL (PRIMARY) HYPERTENSION: Primary | ICD-10-CM

## 2024-07-25 DIAGNOSIS — I42.9 CARDIOMYOPATHY, UNSPECIFIED TYPE (HCC): ICD-10-CM

## 2024-07-25 DIAGNOSIS — I48.0 PAROXYSMAL ATRIAL FIBRILLATION (HCC): ICD-10-CM

## 2024-07-25 RX ORDER — METOPROLOL SUCCINATE 100 MG/1
100 TABLET, EXTENDED RELEASE ORAL DAILY
Qty: 90 TABLET | Refills: 3 | Status: SHIPPED | OUTPATIENT
Start: 2024-07-25

## 2024-07-25 RX ORDER — METOPROLOL SUCCINATE 100 MG/1
100 TABLET, EXTENDED RELEASE ORAL DAILY
COMMUNITY
End: 2024-07-25 | Stop reason: SDUPTHER

## 2024-07-25 NOTE — PROGRESS NOTES
Marija Shrestha presents today for   Chief Complaint   Patient presents with    Follow-up     3 months       Marija Shrestha preferred language for health care discussion is english/other.    Is someone accompanying this pt? yes    Is the patient using any DME equipment during OV? no    Depression Screening:  Depression: Not at risk (4/25/2024)    PHQ-2     PHQ-2 Score: 0        Learning Assessment:  No question data found.       Pt currently taking Anticoagulant therapy? Eliquis  5mg twice a day    Pt currently taking Antiplatelet therapy ? no      Coordination of Care:  1. Have you been to the ER, urgent care clinic since your last visit? Hospitalized since your last visit? no    2. Have you seen or consulted any other health care providers outside of the [facility] since your last visit? Include any pap smears or colon screening. no    
instructed during her last visit.  She was supposed to be taking Toprol XL 50mg daily and bumetanide 1mg every other day.  Her son states that she has been taking Toprol XL 100mg daily and bumetanide 1mg daily.    Her blood pressure when I rechecked it twice, was 160/80.  She reports blood pressures that are sometimes low and sometimes high.  She states that she does not like checking her blood pressures at home because it makes her anxious.  Monitor and record blood pressures at home.  Check BP at least 2-3 hours after taking your medications.  Goal blood pressure is less than 140/90.  If blood pressures are consistently above this goal, she or her son is to call the office as we may need to adjust her medications.    She does not exhibit any signs of volume overload.  Will decrease her bumetanide to 1mg every other day.     Congestive heart failure teaching done today.  Advised to limit sodium intake to no more than 1500-2000mg per day and to also limit fluid intake to 48 ounces per day (unless otherwise specified).  Advised to weigh daily every morning and record weights.  Instructed to call our office if progressive weight gain is noted over a 2 to 3 day period of time, shortness of breath increases, or if abdominal bloating, nausea, fatigue, or increased lower extremity edema is noted.     Time: 30  minutes    She will follow-up with Dr. Feng as scheduled and as needed.      Margot Hermosillo MSN, FNP-BC    Please note:  Portions of this chart were created with Dragon medical speech to text program.  Unrecognized errors may be present.

## 2024-07-25 NOTE — PATIENT INSTRUCTIONS
Continue Toprol XL (metoprolol succinate) 100mg once a day  Decrease bumetanide to 1mg every other day  All other medications to remain the same  Monitor and record blood pressures at home.  Check BP at least 2-3 hours after taking your medications.  Goal blood pressure is less than 140/90.  If blood pressures are consistently above this goal, please call the office as we may need to adjust your medications

## 2024-08-20 RX ORDER — AMIODARONE HYDROCHLORIDE 200 MG/1
200 TABLET ORAL DAILY
Qty: 90 TABLET | Refills: 3 | Status: SHIPPED | OUTPATIENT
Start: 2024-08-20

## 2024-10-31 ENCOUNTER — OFFICE VISIT (OUTPATIENT)
Age: 89
End: 2024-10-31

## 2024-10-31 VITALS
BODY MASS INDEX: 21.79 KG/M2 | OXYGEN SATURATION: 84 % | HEART RATE: 62 BPM | SYSTOLIC BLOOD PRESSURE: 160 MMHG | HEIGHT: 63 IN | DIASTOLIC BLOOD PRESSURE: 90 MMHG | WEIGHT: 123 LBS

## 2024-10-31 DIAGNOSIS — I50.42 CHRONIC COMBINED SYSTOLIC AND DIASTOLIC CONGESTIVE HEART FAILURE (HCC): ICD-10-CM

## 2024-10-31 DIAGNOSIS — I42.9 CARDIOMYOPATHY, UNSPECIFIED TYPE (HCC): ICD-10-CM

## 2024-10-31 DIAGNOSIS — I48.91 ATRIAL FIBRILLATION, UNSPECIFIED TYPE (HCC): ICD-10-CM

## 2024-10-31 DIAGNOSIS — I10 ESSENTIAL (PRIMARY) HYPERTENSION: ICD-10-CM

## 2024-10-31 DIAGNOSIS — R06.02 SOB (SHORTNESS OF BREATH): Primary | ICD-10-CM

## 2024-10-31 PROBLEM — H93.299 ABNORMAL AUDITORY PERCEPTION: Status: ACTIVE | Noted: 2017-08-23

## 2024-10-31 PROBLEM — E78.5 HYPERLIPIDEMIA: Status: ACTIVE | Noted: 2024-02-15

## 2024-10-31 PROBLEM — I50.9 HEART FAILURE (HCC): Status: ACTIVE | Noted: 2024-02-15

## 2024-10-31 PROBLEM — N81.9 PROLAPSE OF FEMALE GENITAL ORGANS: Status: ACTIVE | Noted: 2024-10-31

## 2024-10-31 PROBLEM — H90.3 SENSORINEURAL HEARING LOSS, BILATERAL: Status: ACTIVE | Noted: 2017-08-23

## 2024-10-31 PROBLEM — H93.13 BILATERAL TINNITUS: Status: ACTIVE | Noted: 2017-08-23

## 2024-10-31 PROBLEM — Z86.718 HISTORY OF DEEP VEIN THROMBOSIS: Status: ACTIVE | Noted: 2024-02-15

## 2024-10-31 PROBLEM — H60.549 ACUTE ECZEMATOID OTITIS EXTERNA: Status: ACTIVE | Noted: 2017-08-23

## 2024-10-31 NOTE — PROGRESS NOTES
Marija Shrestha presents today for   Chief Complaint   Patient presents with    Follow-up     6 month       Marija Shrestha preferred language for health care discussion is english/other.    Is someone accompanying this pt? yes    Is the patient using any DME equipment during OV? Yes     Depression Screening:  Depression: Not at risk (7/29/2024)    Received from [facility]    PHQ-2     PHQ-9 Total Score: 2        Learning Assessment:  Who is the primary learner? Patient    What is the preferred language for health care of the primary learner? ENGLISH    How does the primary learner prefer to learn new concepts? DEMONSTRATION    Answered By patient    Relationship to Learner SELF           Pt currently taking Anticoagulant therapy? Eliquis 5 mg 2x daily     Pt currently taking Antiplatelet therapy ? no      Coordination of Care:  1. Have you been to the ER, urgent care clinic since your last visit? Hospitalized since your last visit? no    2. Have you seen or consulted any other health care providers outside of the [facility] since your last visit? Include any pap smears or colon screening. no  n

## 2024-10-31 NOTE — PROGRESS NOTES
Marija Shrestha    Chief Complaint   Patient presents with    Follow-up     6 month       HPI    Marija Shrestha is a 89 y.o. female here to establish her longterm CV care post hospitalization.    Prior to hospitalization pt was only supposed to be on BP meds (which unclear if was even taking). 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 Apparently started c/o LE edema and pain, went to [place] ED with SOB. Had +DVT LLE & PE. pAFib in this situation. EF was reduced with RHF and moderate to severe TR. I dont have detailed records, both sons present who fill in gaps, I can see her last PCP visit as well. Aware her BB recently increased.    She is more SOB, more tired, wants to sleep/ nap during the day. Can't lay flat. Swelling is now in the right LE as well. There is no laura chest pain or pressure. Sometimes feels a \"rubber band\" around her heart but this is while she's resting.    She's having difficulty swallowing so has apparently seen ENT and GI.   Apparently tried PPI and says stopped bc caused a rash.    Past Medical History:   Diagnosis Date    Atrial fibrillation (HCC)     DVT (deep venous thrombosis) (HCC)     Enlarged thyroid     Hypercholesterolemia     Hypertension     Multinodular goiter     Osteoporosis     Paroxysmal nocturnal dyspnea     Post-nasal drip        History reviewed. No pertinent surgical history.    Current Outpatient Medications   Medication Sig Dispense Refill    amiodarone (CORDARONE) 200 MG tablet TAKE 1 TABLET BY MOUTH EVERY DAY 90 tablet 3    metoprolol succinate (TOPROL XL) 100 MG extended release tablet Take 1 tablet by mouth daily 90 tablet 3    spironolactone (ALDACTONE) 25 MG tablet TAKE 1/2 TABLET BY MOUTH DAILY 45 tablet 3    bumetanide (BUMEX) 1 MG tablet Take 1 tablet by mouth every other day      apixaban (ELIQUIS) 5 MG TABS tablet Take 1 tablet by mouth 2 times daily 30 tablet 1     No current facility-administered medications

## 2024-11-18 RX ORDER — BUMETANIDE 1 MG/1
1 TABLET ORAL DAILY
Qty: 30 TABLET | Refills: 6 | Status: SHIPPED | OUTPATIENT
Start: 2024-11-18

## 2024-11-20 ENCOUNTER — TELEPHONE (OUTPATIENT)
Age: 89
End: 2024-11-20

## 2024-11-20 DIAGNOSIS — R06.02 SOB (SHORTNESS OF BREATH): Primary | ICD-10-CM

## 2024-11-20 NOTE — TELEPHONE ENCOUNTER
----- Message from Adolfo MARSHALL sent at 11/19/2024  9:26 AM EST -----  Regarding: PT Called  Patient's son called stating that [facility] Feng [facility] her to can not get her in until March and wanted to know if there was another doctors office she can be referred to.

## 2024-11-22 NOTE — TELEPHONE ENCOUNTER
Verbal order and read back per Louisa Feng, DO    Refer patient to Dr. Fiordaliza Galindo.    This has been fully explained to the patient's son, Peter, who indicates understanding.

## 2025-04-22 NOTE — PROGRESS NOTES
Marija Shrestha presents today for a 6 month check-up.  She was last seen by Dr. Feng on 10/31/24. She was accompanied by her son.  She states that she has been feeling well and offers no cardiac complaints.  She mentions occasional leg weakness.    She is a 89 year old female who established care with Dr. Feng on 1/18/24 after being hospitalized at [place] (in Dec.2023) after presenting with shortness of breath.  She was found to have a DVT in the left lower extremity, bilateral pulmonary emboli, and paroxysmal atrial fibrillation.  During that visit, she was started on lasix 3x/week.  Her sons state that she was not on the medication long and she had to be admitted.    She was admitted to [facility] on 1/26/24 and discharged home on 1/31/24 after presenting with shortness of breath and edema.  She was diagnosed with acute heart failure (BNP was 15,890) and AFIB with RVR.  She was followed by [facility].  An echo was done on 1/27/24 and her EF was reduced at 45%, moderate to severe TR.  RVSP was 58 mmHg.  When compared to the echo done on 12/22/23, her EF had increased from 35-40% to 45%.  Moderate pulmonary hypertension and dilated right ventricle are new.  She underwent cardioversion on 1/30/24 and she converted back to sinus rhythm after receiving 100 joules of energy.  She was prescribed amiodarone 200mg BID x 14 days and then 200mg daily thereafter for 14 days. She is anticoagulated with Eliquis.   She was also discharged home on bumetanide 1mg daily and spironolactone 25mg daily.  During this hospital stay, she was also diagnosed with dysphagia and underwent a barium swallow which showed Esophageal dysmotility in the mid and distal esophagus with narrowing of the distal esophagus as well as a small sliding hiatal hernia.    Marija Shrestha is a 89 y.o. female presents today for :  Chief Complaint   Patient presents with    Follow-up     6 months       PMH:  Past Medical History:

## 2025-04-30 ENCOUNTER — OFFICE VISIT (OUTPATIENT)
Age: 89
End: 2025-04-30
Payer: MEDICARE

## 2025-04-30 VITALS
HEART RATE: 51 BPM | HEIGHT: 63 IN | SYSTOLIC BLOOD PRESSURE: 138 MMHG | DIASTOLIC BLOOD PRESSURE: 62 MMHG | WEIGHT: 120 LBS | BODY MASS INDEX: 21.26 KG/M2 | OXYGEN SATURATION: 96 %

## 2025-04-30 DIAGNOSIS — I50.22 CHRONIC SYSTOLIC HEART FAILURE (HCC): ICD-10-CM

## 2025-04-30 DIAGNOSIS — I10 ESSENTIAL (PRIMARY) HYPERTENSION: Primary | ICD-10-CM

## 2025-04-30 DIAGNOSIS — I48.0 PAROXYSMAL ATRIAL FIBRILLATION (HCC): ICD-10-CM

## 2025-04-30 PROCEDURE — 1090F PRES/ABSN URINE INCON ASSESS: CPT | Performed by: NURSE PRACTITIONER

## 2025-04-30 PROCEDURE — 1159F MED LIST DOCD IN RCRD: CPT | Performed by: NURSE PRACTITIONER

## 2025-04-30 PROCEDURE — G8420 CALC BMI NORM PARAMETERS: HCPCS | Performed by: NURSE PRACTITIONER

## 2025-04-30 PROCEDURE — 1126F AMNT PAIN NOTED NONE PRSNT: CPT | Performed by: NURSE PRACTITIONER

## 2025-04-30 PROCEDURE — G8427 DOCREV CUR MEDS BY ELIG CLIN: HCPCS | Performed by: NURSE PRACTITIONER

## 2025-04-30 PROCEDURE — 1123F ACP DISCUSS/DSCN MKR DOCD: CPT | Performed by: NURSE PRACTITIONER

## 2025-04-30 PROCEDURE — 1160F RVW MEDS BY RX/DR IN RCRD: CPT | Performed by: NURSE PRACTITIONER

## 2025-04-30 PROCEDURE — 1036F TOBACCO NON-USER: CPT | Performed by: NURSE PRACTITIONER

## 2025-04-30 PROCEDURE — 99213 OFFICE O/P EST LOW 20 MIN: CPT | Performed by: NURSE PRACTITIONER

## 2025-04-30 RX ORDER — BUMETANIDE 1 MG/1
1 TABLET ORAL EVERY OTHER DAY
Qty: 1 TABLET | Refills: 0
Start: 2025-04-30

## 2025-04-30 NOTE — PROGRESS NOTES
Marija Shrestha presents today for   Chief Complaint   Patient presents with    Follow-up     6 months       Marija Shrestha preferred language for health care discussion is english/other.    Is someone accompanying this pt? yes    Is the patient using any DME equipment during OV? yes    Depression Screening:  Depression: Not at risk (7/29/2024)    Received from [facility]    PHQ-2     PHQ-9 Total Score: 2        Learning Assessment:  No question data found.       Pt currently taking Anticoagulant therapy? Eliquis 5mg twice a day    Pt currently taking Antiplatelet therapy ? no      Coordination of Care:  1. Have you been to the ER, urgent care clinic since your last visit? Hospitalized since your last visit? no    2. Have you seen or consulted any other health care providers outside of the [facility] since your last visit? Include any pap smears or colon screening. no

## 2025-06-25 RX ORDER — SPIRONOLACTONE 25 MG/1
12.5 TABLET ORAL DAILY
Qty: 45 TABLET | Refills: 3 | Status: SHIPPED | OUTPATIENT
Start: 2025-06-25

## 2025-06-30 RX ORDER — AMIODARONE HYDROCHLORIDE 200 MG/1
200 TABLET ORAL DAILY
Qty: 90 TABLET | Refills: 3 | Status: SHIPPED | OUTPATIENT
Start: 2025-06-30

## 2025-08-04 ENCOUNTER — TELEPHONE (OUTPATIENT)
Age: 89
End: 2025-08-04

## 2025-08-04 DIAGNOSIS — R53.1 WEAKNESS: Primary | ICD-10-CM
